# Patient Record
Sex: MALE | Race: WHITE | NOT HISPANIC OR LATINO | Employment: FULL TIME | URBAN - NONMETROPOLITAN AREA
[De-identification: names, ages, dates, MRNs, and addresses within clinical notes are randomized per-mention and may not be internally consistent; named-entity substitution may affect disease eponyms.]

---

## 2018-11-27 ENCOUNTER — TREATMENT (OUTPATIENT)
Dept: PHYSICAL THERAPY | Facility: CLINIC | Age: 53
End: 2018-11-27

## 2018-11-27 ENCOUNTER — TRANSCRIBE ORDERS (OUTPATIENT)
Dept: PHYSICAL THERAPY | Facility: CLINIC | Age: 53
End: 2018-11-27

## 2018-11-27 DIAGNOSIS — Z87.81 STATUS POST OPEN REDUCTION WITH INTERNAL FIXATION (ORIF) OF FRACTURE OF ANKLE: ICD-10-CM

## 2018-11-27 DIAGNOSIS — S92.911A CLOSED NONDISPLACED FRACTURE OF PHALANX OF TOE OF RIGHT FOOT, UNSPECIFIED TOE, INITIAL ENCOUNTER: ICD-10-CM

## 2018-11-27 DIAGNOSIS — S82.201B TYPE I OR II OPEN FRACTURE OF SHAFT OF RIGHT TIBIA, UNSPECIFIED FRACTURE MORPHOLOGY, INITIAL ENCOUNTER: Primary | ICD-10-CM

## 2018-11-27 DIAGNOSIS — Z98.890 STATUS POST OPEN REDUCTION WITH INTERNAL FIXATION (ORIF) OF FRACTURE OF ANKLE: ICD-10-CM

## 2018-11-27 DIAGNOSIS — M25.571 ACUTE RIGHT ANKLE PAIN: ICD-10-CM

## 2018-11-27 DIAGNOSIS — Z87.81 STATUS POST FRACTURE OF RIGHT TIBIA: Primary | ICD-10-CM

## 2018-11-27 DIAGNOSIS — S82.54XA CLOSED NONDISPLACED FRACTURE OF MEDIAL MALLEOLUS OF RIGHT TIBIA, INITIAL ENCOUNTER: ICD-10-CM

## 2018-11-27 PROCEDURE — 97140 MANUAL THERAPY 1/> REGIONS: CPT | Performed by: PHYSICAL THERAPIST

## 2018-11-27 PROCEDURE — 97161 PT EVAL LOW COMPLEX 20 MIN: CPT | Performed by: PHYSICAL THERAPIST

## 2018-11-27 PROCEDURE — 97110 THERAPEUTIC EXERCISES: CPT | Performed by: PHYSICAL THERAPIST
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
## 2018-11-30 ENCOUNTER — TREATMENT (OUTPATIENT)
Dept: PHYSICAL THERAPY | Facility: CLINIC | Age: 53
End: 2018-11-30

## 2018-11-30 PROCEDURE — 97140 MANUAL THERAPY 1/> REGIONS: CPT | Performed by: PHYSICAL THERAPIST

## 2018-11-30 PROCEDURE — 97110 THERAPEUTIC EXERCISES: CPT | Performed by: PHYSICAL THERAPIST

## 2018-11-30 NOTE — PROGRESS NOTES
Physical Therapy Daily Progress Note        Bar Montague reports 2/10 pain today at rest.  Pt reports pain fluctuates throughout the day and has pain up to 4/10. Pt reports ankle feels stiff but it feels good to move and is encouraged that ankle is moving so well.         Objective Pt present to PT today with no distress at rest.     Pt increased activity today and performed hip and quad exercises to help prepare for WB next visit.       See Exercise, Manual, and Modality Logs for complete treatment.     Assessment/Plan  Pt instructed to continue AROM exercises at the house. Pt to continue PT to help increase ankle ROM, mobility and function and to increase WB of R LE.       Progress per Plan of Care  Begin WB           Manual Therapy:    14     mins  51203;  Therapeutic Exercise:    29     mins  60622;     Neuromuscular Janette:        mins  04457;    Therapeutic Activity:          mins  56952;     Gait Training:        ___  mins  28249;     Ultrasound:          mins  24233;    Electrical Stimulation:         mins  96160 ( );  Dry Needling          mins self-pay    Timed Treatment:   43   mins   Total Treatment:     44   mins    Umer Kamara, PT  Physical Therapist

## 2018-12-04 ENCOUNTER — TREATMENT (OUTPATIENT)
Dept: PHYSICAL THERAPY | Facility: CLINIC | Age: 53
End: 2018-12-04

## 2018-12-04 PROCEDURE — 97140 MANUAL THERAPY 1/> REGIONS: CPT | Performed by: PHYSICAL THERAPIST

## 2018-12-04 PROCEDURE — 97110 THERAPEUTIC EXERCISES: CPT | Performed by: PHYSICAL THERAPIST

## 2018-12-04 PROCEDURE — 97116 GAIT TRAINING THERAPY: CPT | Performed by: PHYSICAL THERAPIST

## 2018-12-05 NOTE — PROGRESS NOTES
Physical Therapy Daily Progress Note        Bar Montague reports 2/10 pain today at rest.  Pt reports doing exercises at home with no problems but feels that the ankle is moving better.         Objective Pt present to PT today with no distress at rest.     Pt instructed in 3 point gait with walker with partial WB on R ankle and foot with boot on.     Pt continues to be most limited in dorsiflexion and stiff in all directions with decreased flexibility in the foot.       See Exercise, Manual, and Modality Logs for complete treatment.     Assessment/Plan  Pt continues to progress well with PT but progression is slow to avoid irritation of ankle and foot. Pt to continue PT to help increase ankle and foot mobility, strength, and function.       Progress per Plan of Care  Assess reaction to gait and WB           Manual Therapy:    17     mins  93431;  Therapeutic Exercise:    29     mins  10906;     Neuromuscular Janette:        mins  38298;    Therapeutic Activity:          mins  58776;     Gait Training:       10 ___  mins  34314;     Ultrasound:          mins  55056;    Electrical Stimulation:         mins  35779 ( );  Dry Needling          mins self-pay    Timed Treatment:   56   mins   Total Treatment:     58   mins    Umer Kamara, PT  Physical Therapist

## 2018-12-06 ENCOUNTER — TREATMENT (OUTPATIENT)
Dept: PHYSICAL THERAPY | Facility: CLINIC | Age: 53
End: 2018-12-06

## 2018-12-06 PROCEDURE — 97140 MANUAL THERAPY 1/> REGIONS: CPT | Performed by: PHYSICAL THERAPIST

## 2018-12-06 PROCEDURE — 97110 THERAPEUTIC EXERCISES: CPT | Performed by: PHYSICAL THERAPIST

## 2018-12-10 ENCOUNTER — TREATMENT (OUTPATIENT)
Dept: PHYSICAL THERAPY | Facility: CLINIC | Age: 53
End: 2018-12-10

## 2018-12-10 PROCEDURE — 97110 THERAPEUTIC EXERCISES: CPT | Performed by: PHYSICAL THERAPIST

## 2018-12-10 PROCEDURE — 97140 MANUAL THERAPY 1/> REGIONS: CPT | Performed by: PHYSICAL THERAPIST

## 2018-12-10 NOTE — PROGRESS NOTES
Physical Therapy Daily Progress Note        Bar Montague reports 1/10 pain today at rest.  Pt reports being a little sore but doing well with partial WB with walking. Pt states he is still encouraged with his ankle movement but is nervous he is not progressing fast enough.         Objective Pt present to PT today with no distress at rest.     Pt continues to have stiff ankle and foot due to healed fractures and surgeries.     Pt doing well with WB and weight shift training.       See Exercise, Manual, and Modality Logs for complete treatment.     Assessment/Plan  Pt progressing slowly with mobility and light strengthening. Pt to continue as tolerated with exercises to help increase function and independence. Pt may benefit from PT to help increase ankle mobility, function, and strength.       Progress per Plan of Care  Pt to progress as tolerated.            Manual Therapy:    17     mins  48242;  Therapeutic Exercise:    40     mins  75792;     Neuromuscular Janette:        mins  32182;    Therapeutic Activity:          mins  79851;     Gait Training:        ___  mins  06833;     Ultrasound:          mins  16559;    Electrical Stimulation:         mins  10305 ( );  Dry Needling          mins self-pay    Timed Treatment:   57   mins   Total Treatment:     58   mins    Umer Kamara, PT  Physical Therapist

## 2018-12-14 ENCOUNTER — TREATMENT (OUTPATIENT)
Dept: PHYSICAL THERAPY | Facility: CLINIC | Age: 53
End: 2018-12-14

## 2018-12-14 PROCEDURE — 97110 THERAPEUTIC EXERCISES: CPT | Performed by: PHYSICAL THERAPIST

## 2018-12-14 PROCEDURE — 97140 MANUAL THERAPY 1/> REGIONS: CPT | Performed by: PHYSICAL THERAPIST

## 2018-12-14 NOTE — PROGRESS NOTES
Subjective   Bar Montague reports: No pain at rest. Expresses frustration at progress.     Objective   Patient tolerated UE supported step throughs well with mild pain in LLE    See Exercise, Manual, and Modality Logs for complete treatment.       Assessment/Plan  Discussed nature and severity of his injury and what expectations may be reasonable. States his ankle felt more loose at end of therapy.  Progress per Plan of Care           Manual Therapy:    18     mins  85501;  Therapeutic Exercise:    41     mins  73506;     Neuromuscular Janette:        mins  73420;    Therapeutic Activity:          mins  52228;     Gait Training:           mins  37363;     Ultrasound:          mins  96107;   Iontophoresis          mins  42172   Electrical Stimulation:         mins  02594 (MC );  Dry Needling          mins self-pay  Fluidotherapy          mins 41547    Timed Treatment:   59   mins   Total Treatment:     59   mins    Fatoumata Verdin, PT  Physical Therapist

## 2018-12-17 ENCOUNTER — TREATMENT (OUTPATIENT)
Dept: PHYSICAL THERAPY | Facility: CLINIC | Age: 53
End: 2018-12-17

## 2018-12-17 PROCEDURE — 97140 MANUAL THERAPY 1/> REGIONS: CPT | Performed by: PHYSICAL THERAPIST

## 2018-12-17 PROCEDURE — 97110 THERAPEUTIC EXERCISES: CPT | Performed by: PHYSICAL THERAPIST

## 2018-12-17 NOTE — PROGRESS NOTES
Physical Therapy Daily Progress Note        Bar Montague reports 1/10 pain today at rest.  Pt states his ankle seems to be moving better and has been up and about more in the house. Pt states that he has begun to have some pain when ankle elevated.         Objective Pt present to PT today with no distress at rest.     Pt tolerated exercises today with slight pain in foot and ankle with PWB exercises.     Pt with improved motion in R ankle and less irritation/pain with manual stretching.       See Exercise, Manual, and Modality Logs for complete treatment.     Assessment/Plan  Pt continues to improve mobility and WB tolerance through PT. Pt instructed to continue HEP and WB as tolerated with boot. Pt to continue PT in order to increase ankle mobility, strength, and function.       Progress per Plan of Care  Measure ankle ROM           Manual Therapy:    16     mins  72006;  Therapeutic Exercise:    39     mins  17603;     Neuromuscular Janette:        mins  97013;    Therapeutic Activity:          mins  88460;     Gait Training:        ___  mins  58650;     Ultrasound:          mins  88680;    Electrical Stimulation:         mins  64788 ( );  Dry Needling          mins self-pay    Timed Treatment:   55   mins   Total Treatment:     55   mins    Umer Kamara, PT  Physical Therapist

## 2018-12-19 ENCOUNTER — TREATMENT (OUTPATIENT)
Dept: PHYSICAL THERAPY | Facility: CLINIC | Age: 53
End: 2018-12-19

## 2018-12-19 PROCEDURE — 97140 MANUAL THERAPY 1/> REGIONS: CPT | Performed by: PHYSICAL THERAPIST

## 2018-12-19 PROCEDURE — 97110 THERAPEUTIC EXERCISES: CPT | Performed by: PHYSICAL THERAPIST

## 2018-12-19 NOTE — PROGRESS NOTES
Physical Therapy Daily Progress Note        Bar Montague reports 1-2/10 pain today at rest.  Pt reports icing some at home since last visit which has helped a little. Pt reports he has accidentally put full weight on R foot a couple times over the past few days which has caused some pain and discomfort.         Objective Pt present to PT today with no distress at rest.     Plantarflexion: 56  Dorsiflexion: 0  Inversion: 15   Eversion: 12    Pt tolerated exercises well today with no increased pain.       See Exercise, Manual, and Modality Logs for complete treatment.     Assessment/Plan  Pt with improved ankle mobility and doing well with light strengthening and mobility exercises. Pt to continue working on mobility and WB exercises to increase WB tolerance.       Progress per Plan of Care  Assess WB tolerance           Manual Therapy:    17     mins  13086;  Therapeutic Exercise:    43     mins  86883;     Neuromuscular Janette:        mins  59146;    Therapeutic Activity:          mins  94003;     Gait Training:        ___  mins  77987;     Ultrasound:          mins  96308;    Electrical Stimulation:         mins  34387 ( );  Dry Needling          mins self-pay    Timed Treatment:   60   mins   Total Treatment:     60   mins    Umer Kamara, PT  Physical Therapist

## 2018-12-21 ENCOUNTER — TREATMENT (OUTPATIENT)
Dept: PHYSICAL THERAPY | Facility: CLINIC | Age: 53
End: 2018-12-21

## 2018-12-21 PROCEDURE — 97140 MANUAL THERAPY 1/> REGIONS: CPT | Performed by: PHYSICAL THERAPIST

## 2018-12-21 PROCEDURE — 97110 THERAPEUTIC EXERCISES: CPT | Performed by: PHYSICAL THERAPIST

## 2018-12-21 NOTE — PROGRESS NOTES
Physical Therapy Daily Progress Note        Bar Montague reports 0/10 pain today at rest.  Pt reports that his ankle and foot have been a little more sore but is doing well. Pt is concerned that he will not be able to return to work due to the decreased function of his ankle and foot.         Objective Pt present to PT today with no distress at rest.     Pt with more mobility in tarsal and metatarsal joints today.     Pt tolerated exercises well today with but had some irritation with calf stretching in the anterior syndesmosis of ankle.     See Exercise, Manual, and Modality Logs for complete treatment.     Assessment/Plan  Pt progressing slowly with mobility but improving with ROM in ankle. Pt instructed to continue advance WB tolerance when walking while at home. Pt to continue PT to help increase ankle and foot mobility and strength.       Progress per Plan of Care  Progress WB as tolerated.            Manual Therapy:    16     mins  20471;  Therapeutic Exercise:    39     mins  31233;     Neuromuscular Janette:        mins  01509;    Therapeutic Activity:          mins  10974;     Gait Training:        ___  mins  10002;     Ultrasound:          mins  04407;    Electrical Stimulation:         mins  42228 ( );  Dry Needling          mins self-pay    Timed Treatment:   55   mins   Total Treatment:     65   mins    Umer Kamara, PT  Physical Therapist

## 2018-12-26 ENCOUNTER — TREATMENT (OUTPATIENT)
Dept: PHYSICAL THERAPY | Facility: CLINIC | Age: 53
End: 2018-12-26

## 2018-12-26 PROCEDURE — 97140 MANUAL THERAPY 1/> REGIONS: CPT | Performed by: PHYSICAL THERAPIST

## 2018-12-26 PROCEDURE — 97110 THERAPEUTIC EXERCISES: CPT | Performed by: PHYSICAL THERAPIST

## 2018-12-26 NOTE — PROGRESS NOTES
Physical Therapy Daily Progress Note        Bar Montague reports 0/10 pain today at rest.  Pt reports trying to put more weight through R LE when walking and even tried to use cane. Pt continues to express worry about his job status and whether he will be able to return to former position at his job.         Objective Pt present to PT today with no distress at rest.     Pt tolerated exercises well today with no increase in pain.     Pt foot with several cavitations today with manual work on foot and toes. Pt encouraged to concentrate on his rehab and not worry about his job at this point.       See Exercise, Manual, and Modality Logs for complete treatment.     Assessment/Plan  Pt continues to improve slowly with strengthening of ankle and WB. Pt ROM still limited due to being in boot and scar tissue. Pt to continue PT to help increase ankle and foot mobility and function to improve WB tolerance and activity tolerance.       Progress per Plan of Care  Progress WB as tolerated            Manual Therapy:    17     mins  50817;  Therapeutic Exercise:    39     mins  34522;     Neuromuscular Janette:        mins  93406;    Therapeutic Activity:          mins  62178;     Gait Training:        ___  mins  75929;     Ultrasound:          mins  71619;    Electrical Stimulation:         mins  95632 ( );  Dry Needling          mins self-pay    Timed Treatment:   56   mins   Total Treatment:     56   mins    Umer Kamara, PT  Physical Therapist

## 2018-12-28 ENCOUNTER — TREATMENT (OUTPATIENT)
Dept: PHYSICAL THERAPY | Facility: CLINIC | Age: 53
End: 2018-12-28

## 2018-12-28 PROCEDURE — 97110 THERAPEUTIC EXERCISES: CPT | Performed by: PHYSICAL THERAPIST

## 2018-12-28 PROCEDURE — 97140 MANUAL THERAPY 1/> REGIONS: CPT | Performed by: PHYSICAL THERAPIST

## 2018-12-31 ENCOUNTER — TREATMENT (OUTPATIENT)
Dept: PHYSICAL THERAPY | Facility: CLINIC | Age: 53
End: 2018-12-31

## 2018-12-31 PROCEDURE — 97140 MANUAL THERAPY 1/> REGIONS: CPT | Performed by: PHYSICAL THERAPIST

## 2018-12-31 PROCEDURE — 97110 THERAPEUTIC EXERCISES: CPT | Performed by: PHYSICAL THERAPIST

## 2018-12-31 NOTE — PROGRESS NOTES
Physical Therapy Daily Progress Note        Bar Montague reports 2-3/10 pain today at rest.  Pt states that he is sore a little today due to being up and about a lot on his feet yesterday. Pt reports that he feels that the spot on his anterior shin continues to swell when WB.         Objective Pt present to PT today with no distress at rest.     Pt with some relief after PT today and said that it felt better when WB when he left.       See Exercise, Manual, and Modality Logs for complete treatment.     Assessment/Plan  Pt continues to improve slowly with motion and strength. Pt instructed to continue HEP and progress WB. Pt to continue PT to help increase function and activity tolerance in R ankle and foot.       Progress per Plan of Care  Progress as tolerated           Manual Therapy:    18     mins  59650;  Therapeutic Exercise:    39     mins  82415;     Neuromuscular Janette:        mins  44370;    Therapeutic Activity:          mins  74339;     Gait Training:        ___  mins  63270;     Ultrasound:          mins  77243;    Electrical Stimulation:         mins  94917 ( );  Dry Needling          mins self-pay    Timed Treatment:   57   mins   Total Treatment:     57   mins    Umer Kamara, PT  Physical Therapist

## 2019-01-02 ENCOUNTER — TREATMENT (OUTPATIENT)
Dept: PHYSICAL THERAPY | Facility: CLINIC | Age: 54
End: 2019-01-02

## 2019-01-02 PROCEDURE — 97110 THERAPEUTIC EXERCISES: CPT | Performed by: PHYSICAL THERAPIST

## 2019-01-02 PROCEDURE — 97140 MANUAL THERAPY 1/> REGIONS: CPT | Performed by: PHYSICAL THERAPIST

## 2019-01-02 NOTE — PROGRESS NOTES
Physical Therapy Daily Progress Note        Bar Montague reports 1/10 pain today at rest.  Pt reports that the ankle and foot have been feeling pretty good. Pt states that he believes he is improving.         Objective Pt present to PT today with no distress at rest.     Pt with noted improved motion with ABCs and exercises today.     Pt tolerated exercises well today with no increased pain in ankle and foot with activities today.       See Exercise, Manual, and Modality Logs for complete treatment.     Assessment/Plan  Pt slowly improving motion and strength in R ankle. Pt instructed to continue HEP and ankle mobility and strengthening exercises. Pt may benefit from PT to help continue to increase strength and activity tolerance in ankle and foot.       Progress per Plan of Care  Progress as tolerated.           Manual Therapy:    12     mins  79216;  Therapeutic Exercise:    50     mins  46922;     Neuromuscular Janette:        mins  96921;    Therapeutic Activity:          mins  73187;     Gait Training:        ___  mins  32469;     Ultrasound:          mins  09420;    Electrical Stimulation:         mins  24712 ( );  Dry Needling          mins self-pay    Timed Treatment:   62   mins   Total Treatment:     62   mins    Umer Kamara, PT  Physical Therapist

## 2019-01-04 ENCOUNTER — TREATMENT (OUTPATIENT)
Dept: PHYSICAL THERAPY | Facility: CLINIC | Age: 54
End: 2019-01-04

## 2019-01-04 PROCEDURE — 97140 MANUAL THERAPY 1/> REGIONS: CPT | Performed by: PHYSICAL THERAPIST

## 2019-01-04 PROCEDURE — 97110 THERAPEUTIC EXERCISES: CPT | Performed by: PHYSICAL THERAPIST

## 2019-01-04 NOTE — PROGRESS NOTES
Physical Therapy Daily Progress Note        Bar Montague reports 0/10 pain today at rest.  Pt reports trying to take steps without brace and instructed by PT to continue using brace and advance WB with less support in the boot. Pt states he is really worried about is job status but thinks he is advancing well with PT.         Objective Pt present to PT today with no distress at rest.     Pt continues to do better with weight shift activities bearing more weight through R ankle.     Pt tolerated exercises well with no increased pain in ankle.       See Exercise, Manual, and Modality Logs for complete treatment.     Assessment/Plan  Pt to continue mobility and strengthening to help increase WB tolerance and function in R ankle and foot. Pt would benefit from continued PT to help increase function in order to return to work.       Progress per Plan of Care  Assess WB tolerance and measure ROM           Manual Therapy:    14     mins  63682;  Therapeutic Exercise:    47     mins  90842;     Neuromuscular Janette:        mins  77128;    Therapeutic Activity:          mins  45823;     Gait Training:        ___  mins  22721;     Ultrasound:          mins  09895;    Electrical Stimulation:         mins  33926 ( );  Dry Needling          mins self-pay    Timed Treatment:   61   mins   Total Treatment:     62   mins    Umer Kamara, PT  Physical Therapist

## 2019-01-07 ENCOUNTER — TREATMENT (OUTPATIENT)
Dept: PHYSICAL THERAPY | Facility: CLINIC | Age: 54
End: 2019-01-07

## 2019-01-07 PROCEDURE — 97110 THERAPEUTIC EXERCISES: CPT | Performed by: PHYSICAL THERAPIST

## 2019-01-07 PROCEDURE — 97140 MANUAL THERAPY 1/> REGIONS: CPT | Performed by: PHYSICAL THERAPIST

## 2019-01-07 NOTE — PROGRESS NOTES
Physical Therapy Daily Progress Note        Bar Montague reports 1/10 pain today at rest.  Pt reports that the ankle and foot are doing much better today. Pt states he took the lining out of his boot and rearranged the brace which helped greatly allowing him more support. Pt reports his foot has been a little more swollen due to being up on it more this weekend.         Objective Pt present to PT today with no distress a rest.     R Ankle AROM  PF: 34  DF: 6  Inversion: 21  Eversion: 10    Pt tolerated exercises well today with no increased pain in foot or ankle.       See Exercise, Manual, and Modality Logs for complete treatment.     Assessment/Plan  Pt continues to improve WB tolerance with brace on and improving ankle strength and motion. Pt with decreased ankle ROM and stability due to prolonged immobilization and surgery. Pt may benefit from PT to help continue to improve ROM, strength, and WB tolerance to improve function of ankle and foot in order to return to work and daily activities.       Progress per Plan of Care  Wait for MD orders           Manual Therapy:    11     mins  44440;  Therapeutic Exercise:    40     mins  31748;     Neuromuscular Janette:        mins  63860;    Therapeutic Activity:          mins  77793;     Gait Training:        ___  mins  67423;     Ultrasound:          mins  63369;    Electrical Stimulation:         mins  33470 ( );  Dry Needling          mins self-pay    Timed Treatment:   51   mins   Total Treatment:     63   mins    Umer Kamara, PT  Physical Therapist

## 2019-01-11 ENCOUNTER — TRANSCRIBE ORDERS (OUTPATIENT)
Dept: PHYSICAL THERAPY | Facility: CLINIC | Age: 54
End: 2019-01-11

## 2019-01-11 ENCOUNTER — TREATMENT (OUTPATIENT)
Dept: PHYSICAL THERAPY | Facility: CLINIC | Age: 54
End: 2019-01-11

## 2019-01-11 DIAGNOSIS — IMO0001: ICD-10-CM

## 2019-01-11 DIAGNOSIS — S82.209E: Primary | ICD-10-CM

## 2019-01-11 DIAGNOSIS — S82.56XD: ICD-10-CM

## 2019-01-11 PROCEDURE — 97110 THERAPEUTIC EXERCISES: CPT | Performed by: PHYSICAL THERAPIST

## 2019-01-11 PROCEDURE — 97140 MANUAL THERAPY 1/> REGIONS: CPT | Performed by: PHYSICAL THERAPIST

## 2019-01-11 NOTE — PROGRESS NOTES
Physical Therapy Daily Progress Note        Bar Montague reports 0/10 pain today at rest.  Pt reports going to the doctor who was pleased with progress. Pt states that he is to wean off walker and boot. Pt states that he is excited about the progress he has seen in the last 1-2 weeks and feeling better mentally about things.         Objective Pt present to PT today with no distress at rest.     Pt educated on use of cane and able to walk well with no pain with cane in clinic.     Pt able to bear weight through foot and ankle with weight shift exercise without boot.       See Exercise, Manual, and Modality Logs for complete treatment.     Assessment/Plan  Pt has improved noticeably over the last several visits and is in much better spirits about his injury. Pt to begin WB without boot and using cane to ambulate while in boot. Pt may benefit from continued PT to help increase activity tolerance, strength, and stability in R foot and ankle.       Progress per Plan of Care  Progress WB and strengthening           Manual Therapy:    10     mins  33581;  Therapeutic Exercise:    52     mins  15180;     Neuromuscular Janette:        mins  24719;    Therapeutic Activity:          mins  87294;     Gait Training:        ___  mins  43199;     Ultrasound:          mins  85264;    Electrical Stimulation:         mins  20399 ( );  Dry Needling          mins self-pay    Timed Treatment:   62   mins   Total Treatment:     62   mins    Umer Kamara, PT  Physical Therapist

## 2019-01-15 ENCOUNTER — TREATMENT (OUTPATIENT)
Dept: PHYSICAL THERAPY | Facility: CLINIC | Age: 54
End: 2019-01-15

## 2019-01-15 PROCEDURE — 97140 MANUAL THERAPY 1/> REGIONS: CPT | Performed by: PHYSICAL THERAPIST

## 2019-01-15 PROCEDURE — 97110 THERAPEUTIC EXERCISES: CPT | Performed by: PHYSICAL THERAPIST

## 2019-01-15 NOTE — PROGRESS NOTES
Physical Therapy Daily Progress Note        Bar Montague reports <1/10 pain today at rest.  Pt states that he has not used his walker and has been using a cane since last visit. Pt states he has taken steps to bathroom and back without boot on. Pt states that he has not had much pain but swelling has increased slightly.         Objective Pt present to PT today with no distress at rest today.     Pt tolerated increased WB exercises today with only slight discomfort in foot and ankle which did not linger after exercise.       See Exercise, Manual, and Modality Logs for complete treatment.     Assessment/Plan  Pt continues to improve greatly with function over the last couple of weeks. Pt is in much better spirits about his injury and work situation. Pt to continue PT to help increase ankle and foot ROM, mobility, strength, and function to help pt return to work.       Progress per Plan of Care  Progress WB as tolerated           Manual Therapy:    13     mins  11471;  Therapeutic Exercise:    43     mins  88470;     Neuromuscular Janette:        mins  15478;    Therapeutic Activity:          mins  74668;     Gait Training:        ___  mins  57033;     Ultrasound:          mins  92240;    Electrical Stimulation:         mins  37193 ( );  Dry Needling          mins self-pay    Timed Treatment:   56   mins   Total Treatment:     58   mins    Umer Kamara, PT  Physical Therapist

## 2019-01-17 ENCOUNTER — TREATMENT (OUTPATIENT)
Dept: PHYSICAL THERAPY | Facility: CLINIC | Age: 54
End: 2019-01-17

## 2019-01-17 PROCEDURE — 97110 THERAPEUTIC EXERCISES: CPT | Performed by: PHYSICAL THERAPIST

## 2019-01-17 PROCEDURE — 97140 MANUAL THERAPY 1/> REGIONS: CPT | Performed by: PHYSICAL THERAPIST

## 2019-01-17 NOTE — PROGRESS NOTES
Physical Therapy Daily Progress Note        Bar Montague reports 0/10 pain today at rest.  Pt states that he continues to have swelling in the ankle but only a little soreness. Pt states that the ankle does not hurt and has been trying to walk with and without boot on around the house. Pt reports that he feels really good about his progress.         Objective Pt present to PT today with no distress at rest.     Pt tolerated exercises well today with no increased pain.       See Exercise, Manual, and Modality Logs for complete treatment.     Assessment/Plan  Pt continues to improve ankle and foot WB tolerance and strength. Pt progressing well with WB activities and will continue advancing as tolerated. Pt may benefit from PT to help increase ankle and foot mobility, function, and activity tolerance to help increase pain free function with ambulation and daily tasks.       Progress per Plan of Care  Progress as tolerated           Manual Therapy:    14     mins  86330;  Therapeutic Exercise:    42     mins  52617;     Neuromuscular Janette:        mins  10517;    Therapeutic Activity:          mins  15459;     Gait Training:        ___  mins  78773;     Ultrasound:          mins  93333;    Electrical Stimulation:         mins  47317 ( );  Dry Needling          mins self-pay    Timed Treatment:   56   mins   Total Treatment:     57   mins    Umer Kamara, PT  Physical Therapist

## 2019-01-22 ENCOUNTER — TREATMENT (OUTPATIENT)
Dept: PHYSICAL THERAPY | Facility: CLINIC | Age: 54
End: 2019-01-22

## 2019-01-22 PROCEDURE — 97110 THERAPEUTIC EXERCISES: CPT | Performed by: PHYSICAL THERAPIST

## 2019-01-22 PROCEDURE — 97140 MANUAL THERAPY 1/> REGIONS: CPT | Performed by: PHYSICAL THERAPIST

## 2019-01-22 NOTE — PROGRESS NOTES
Physical Therapy Daily Progress Note        Bar Montague reports <1/10 pain today at rest.  Pt states he has had some pain in the R knee beneath patella and lateral ankle. Pt reports ankle still doing much better but sometimes overdoes things at home.         Objective Pt present to PT today with no distress at rest.     Pt tolerated exercises well today with no increased pain in R ankle.       See Exercise, Manual, and Modality Logs for complete treatment.     Assessment/Plan  Pt continues to increase mobility and function of R foot and ankle. Pt to continue working on strengthening and WB tolerance. Pt may benefit from PT to help increase mobility and function of ankle to help return to normal ambulation and work duties.       Progress per Plan of Care  Progress as tolerated           Manual Therapy:    12     mins  47550;  Therapeutic Exercise:    47     mins  78441;     Neuromuscular Janette:        mins  34768;    Therapeutic Activity:          mins  38812;     Gait Training:        ___  mins  57954;     Ultrasound:          mins  09339;    Electrical Stimulation:         mins  21291 ( );  Dry Needling          mins self-pay    Timed Treatment:   59   mins   Total Treatment:     60   mins    Umer Kamara, PT  Physical Therapist

## 2019-01-24 ENCOUNTER — TREATMENT (OUTPATIENT)
Dept: PHYSICAL THERAPY | Facility: CLINIC | Age: 54
End: 2019-01-24

## 2019-01-24 PROCEDURE — 97110 THERAPEUTIC EXERCISES: CPT | Performed by: PHYSICAL THERAPIST

## 2019-01-24 PROCEDURE — 97140 MANUAL THERAPY 1/> REGIONS: CPT | Performed by: PHYSICAL THERAPIST

## 2019-01-25 NOTE — PROGRESS NOTES
Physical Therapy Daily Progress Note        Bar Montague reports 0/10 pain today at rest.  Pt states that ankle is achy and swells periodically during the day at the house. Pt states that his knee has bothered him some due to uneven walking with boot. Pt reports he is still pleased with progression with PT.         Objective Pt present to PT today with no distress at rest.     Pt tolerated exercises well today with PT session today with no increased pain in ankle.       See Exercise, Manual, and Modality Logs for complete treatment.     Assessment/Plan  Pt continues to advance WB and strengthening exercises. Pt ankle is still stiff and limited in ROM in all directions although not painful. Pt would benefit from continued PT to help increase ankle mobility, strength, and function to help increase WB activities and activity tolerance.       Progress per Plan of Care  Progress as tolerated           Manual Therapy:    14     mins  91016;  Therapeutic Exercise:    50     mins  19406;     Neuromuscular Janette:        mins  82565;    Therapeutic Activity:          mins  62785;     Gait Training:        ___  mins  64792;     Ultrasound:          mins  81957;    Electrical Stimulation:         mins  05825 ( );  Dry Needling          mins self-pay    Timed Treatment:   64   mins   Total Treatment:     64   mins    Umer Kamara, PT  Physical Therapist

## 2019-01-29 ENCOUNTER — TREATMENT (OUTPATIENT)
Dept: PHYSICAL THERAPY | Facility: CLINIC | Age: 54
End: 2019-01-29

## 2019-01-29 PROCEDURE — 97112 NEUROMUSCULAR REEDUCATION: CPT | Performed by: PHYSICAL THERAPIST

## 2019-01-29 PROCEDURE — 97140 MANUAL THERAPY 1/> REGIONS: CPT | Performed by: PHYSICAL THERAPIST

## 2019-01-29 PROCEDURE — 97110 THERAPEUTIC EXERCISES: CPT | Performed by: PHYSICAL THERAPIST

## 2019-01-29 NOTE — PROGRESS NOTES
Physical Therapy Daily Progress Note        Bar Montague reports 2/10 pain today at rest.  Pt reports that he continues to do well with WB and moving at house. Pt states he has been walking some inside the house without the boot to help with WB. Pt reports that he does get fluctuating pain depending on activity and fatigues quickly after shopping at several stores.         Objective Pt present to PT today with no distress at rest.     Pt tolerated exercises well today with no increased pain in ankle.     Pt performed trial of PWB walking on treadmill to help with gait pattern. Pt walked well but still had decreased WB time on R LE.     Pt began working on intrinsics of foot more today.       See Exercise, Manual, and Modality Logs for complete treatment.     Assessment/Plan  Pt progressing slowly but continues to do well with WB and functional exercises.  Pt with improved gait cycle with decreased WB on treadmill. Pt to begin focusing more on intrinsic strength and toe mobility to help improve stability, balance, and function of foot and ankle.       Progress per Plan of Care  Progress as tolerated           Manual Therapy:    12     mins  53009;  Therapeutic Exercise:    30     mins  81282;     Neuromuscular Janette:    16    mins  62286;    Therapeutic Activity:          mins  21491;     Gait Training:        ___  mins  31281;     Ultrasound:          mins  87847;    Electrical Stimulation:         mins  60854 ( );  Dry Needling          mins self-pay    Timed Treatment:   58   mins   Total Treatment:     58   mins    Umer Kamara, PT  Physical Therapist

## 2019-02-01 ENCOUNTER — TREATMENT (OUTPATIENT)
Dept: PHYSICAL THERAPY | Facility: CLINIC | Age: 54
End: 2019-02-01

## 2019-02-01 PROCEDURE — 97110 THERAPEUTIC EXERCISES: CPT | Performed by: PHYSICAL THERAPIST

## 2019-02-01 PROCEDURE — 97140 MANUAL THERAPY 1/> REGIONS: CPT | Performed by: PHYSICAL THERAPIST

## 2019-02-01 NOTE — PROGRESS NOTES
Physical Therapy Daily Progress Note        Bar Montague reports 0/10 pain today at rest.  Pt reports having fluctuating pain from day to day in foot and lateral ankle. Pt reports that he has been doing stairs at home with and without boot. Pt states that he is feeling a little more discouraged about progress again and feels he has hit a  [place] again.          Objective Pt present to PT today with no distress at rest.     Pt with decreased hip extension and toe off with R LE. Pt tolerated exercises well today with no increased pain in foot or ankle.       See Exercise, Manual, and Modality Logs for complete treatment.     Assessment/Plan  Pt continues to present with abnormal gait but is improving with strength and function of foot and ankle. Pt to continue PT to help increase function, mobility, and activity tolerance of R ankle and foot to help restore normal gait and function.       Progress per Plan of Care  Gait training           Manual Therapy:    11     mins  48294;  Therapeutic Exercise:    47     mins  49233;     Neuromuscular Janette:        mins  62129;    Therapeutic Activity:          mins  41321;     Gait Training:        ___  mins  02694;     Ultrasound:          mins  04134;    Electrical Stimulation:         mins  19596 ( );  Dry Needling          mins self-pay    Timed Treatment:   58   mins   Total Treatment:     58   mins    Umer Kamara, PT  Physical Therapist

## 2019-02-05 ENCOUNTER — TREATMENT (OUTPATIENT)
Dept: PHYSICAL THERAPY | Facility: CLINIC | Age: 54
End: 2019-02-05

## 2019-02-05 PROCEDURE — 97140 MANUAL THERAPY 1/> REGIONS: CPT | Performed by: PHYSICAL THERAPIST

## 2019-02-05 PROCEDURE — 97110 THERAPEUTIC EXERCISES: CPT | Performed by: PHYSICAL THERAPIST

## 2019-02-07 ENCOUNTER — TREATMENT (OUTPATIENT)
Dept: PHYSICAL THERAPY | Facility: CLINIC | Age: 54
End: 2019-02-07

## 2019-02-07 PROCEDURE — 97530 THERAPEUTIC ACTIVITIES: CPT | Performed by: PHYSICAL THERAPIST

## 2019-02-07 PROCEDURE — 97140 MANUAL THERAPY 1/> REGIONS: CPT | Performed by: PHYSICAL THERAPIST

## 2019-02-07 PROCEDURE — 97110 THERAPEUTIC EXERCISES: CPT | Performed by: PHYSICAL THERAPIST

## 2019-02-07 NOTE — PROGRESS NOTES
Physical Therapy Daily Progress Note        Bar Montague reports 0/10 pain today at rest.  Pt reports that his ankle has been doing well and has been walking a lot in the boot without the cane and some without the boot or cane. Pt reports he has been much more active around the house. Pt states he plans to get an active ankle brace this weekend.         Objective Pt present to PT today with no distress at rest.     Pt tolerated exercises well today with slight discomfort in ankle but no pain lingering after session.     Pt able to lift 27 lbs box off floor to above head. Pt able to perform full squat without pain although used L LE more than R.        See Exercise, Manual, and Modality Logs for complete treatment.     Assessment/Plan  Pt slowly gaining increased strength and function in R ankle and foot. Pt to increase activity at the house to improve squatting and WB tolerance and function. Pt would benefit from PT to help increase function, strength, and mobility of R ankle and foot to enable him to return to work.       Progress per Plan of Care  Progress as tolerated           Manual Therapy:    13     mins  35495;  Therapeutic Exercise:    31     mins  19151;     Neuromuscular Janette:        mins  45465;    Therapeutic Activity:     9     mins  93249;     Gait Training:        ___  mins  06782;     Ultrasound:          mins  47686;    Electrical Stimulation:         mins  69931 ( );  Dry Needling          mins self-pay    Timed Treatment:   53   mins   Total Treatment:     55   mins    Umer Kamara, PT  Physical Therapist

## 2019-02-13 ENCOUNTER — TREATMENT (OUTPATIENT)
Dept: PHYSICAL THERAPY | Facility: CLINIC | Age: 54
End: 2019-02-13

## 2019-02-13 PROCEDURE — 97110 THERAPEUTIC EXERCISES: CPT | Performed by: PHYSICAL THERAPIST

## 2019-02-13 NOTE — PROGRESS NOTES
Physical Therapy Daily Progress Note        Bar Montague reports 0/10 pain today at rest.  Pt reports he bought an ankle brace this weekend with laces and straps for stability. Pt states he has been wearing it exclusively and not the boot and has not been using his cane. Pt reports that he has had some soreness because he has been doing a lot on his feet, but pt says he is feeling good and is excited.         Objective Pt present to PT today with no distress at rest.     Pt able to squat and perform 52.5 lbs box lift from the floor to waist and then above head.     Pt able to perform all exercises today without increased pain in R ankle or foot.       See Exercise, Manual, and Modality Logs for complete treatment.     Assessment/Plan  Pt continues to improve and is seeing doctor on the 20th for follow up. Pt does not bear weight passively when just standing up and tends to avoid putting weight through leg. When concentrating, pt does well with WB on R LE and foot. Pt would benefit from continued PT to help increase WB on R LE and strength in R LE to help return to work.       Progress per Plan of Care  Progress functional strengthening           Manual Therapy:    5     mins  94922;  Therapeutic Exercise:     54    mins  53561;     Neuromuscular Janette:        mins  22158;    Therapeutic Activity:          mins  06868;     Gait Training:        ___  mins  79105;     Ultrasound:          mins  24783;    Electrical Stimulation:         mins  98382 ( );  Dry Needling          mins self-pay    Timed Treatment:   59   mins   Total Treatment:     59   mins    Umer Kamara, PT  Physical Therapist

## 2019-02-15 ENCOUNTER — TREATMENT (OUTPATIENT)
Dept: PHYSICAL THERAPY | Facility: CLINIC | Age: 54
End: 2019-02-15

## 2019-02-15 PROCEDURE — 97110 THERAPEUTIC EXERCISES: CPT | Performed by: PHYSICAL THERAPIST

## 2019-02-15 PROCEDURE — 97530 THERAPEUTIC ACTIVITIES: CPT | Performed by: PHYSICAL THERAPIST

## 2019-02-15 NOTE — PROGRESS NOTES
Physical Therapy Daily Progress Note        Bar Montague reports 0/10 pain today at rest.  Pt reports that he has been doing a lot and probably overdid activity yesterday being very sore after running some errands. Pt reports all activities we performed during last session felt okay the next day. Pt reports that he is seeing doctor on the 20th and is hoping to be released to work.         Objective Pt present to PT today with no distress at rest.     Pt tolerated increased WB and functional activities well today with no increased pain in ankle and foot.     Pt with some notable increase in limp when carrying 40 lbs box.       See Exercise, Manual, and Modality Logs for complete treatment.     Assessment/Plan  Pt continues to make improvements and function, strength, and activity tolerance of R ankle and foot. However, pt still has some movement and strength deficits that would benefit from being addressed prior to returning to work. Pt has said he is prepared to go back to work if necessary to keep his job, but PT believes that he would greatly benefit from 2-3 more weeks of PT to help increase strength and function and reduce chance of injury at work.       Progress per Plan of Care  Progress work simulations           Manual Therapy:         mins  55693;  Therapeutic Exercise:    31     mins  01015;     Neuromuscular Janette:        mins  79649;    Therapeutic Activity:     27     mins  58437;     Gait Training:        ___  mins  94551;     Ultrasound:          mins  75135;    Electrical Stimulation:         mins  08934 ( );  Dry Needling          mins self-pay    Timed Treatment:   58   mins   Total Treatment:     58   mins    Umer Kamara, PT  Physical Therapist

## 2019-02-19 ENCOUNTER — TREATMENT (OUTPATIENT)
Dept: PHYSICAL THERAPY | Facility: CLINIC | Age: 54
End: 2019-02-19

## 2019-02-19 PROCEDURE — 97110 THERAPEUTIC EXERCISES: CPT | Performed by: PHYSICAL THERAPIST

## 2019-02-19 PROCEDURE — 97530 THERAPEUTIC ACTIVITIES: CPT | Performed by: PHYSICAL THERAPIST

## 2019-02-19 NOTE — PROGRESS NOTES
Physical Therapy Daily Progress Note        Bar Montague reports <1/10 pain today at rest.  Pt reports that he was sore after last visit with the increased intensity but had no ankle or foot pain. Pt reports that he feels good about the ankle and foot but is most concerned about his endurance going back to work. Pt states he sees the doctor tomorrow and is hoping to be cleared to return to work. Pt states that he feels pressure to get back to his job.         Objective Pt present to PT today with no distress at rest.     R Ankle AROM:  Plantarflexion: 40  Dorsiflexion: 7  Inversion: 24  Eversion: 16    Isometric MMT:  Plantarflexion: 5/5  Dorsiflexion: 5/5  Inversion: 4+/5  Eversion: 4+/5     Pt required breaks which limited most of his activities today. Pt able to perform at a higher intensity today but did fatigue and get winded.       See Exercise, Manual, and Modality Logs for complete treatment.     Assessment/Plan  Pt ankle and foot have improved greatly with PT. Pt doing much better with functional and WB exercises but has difficulty with endurance. Pt would benefit from 2-3 more weeks of PT to work on endurance and work hardening but may have to return to work in order to keep job according pt. PT to await word from pt following doctor visit.       Progress per Plan of Care  Await doctor orders.            Manual Therapy:         mins  80403;  Therapeutic Exercise:    29     mins  47955;     Neuromuscular Janette:        mins  34561;    Therapeutic Activity:     26     mins  41213;     Gait Training:        ___  mins  65351;     Ultrasound:          mins  39324;    Electrical Stimulation:         mins  51466 ( );  Dry Needling          mins self-pay    Timed Treatment:   55   mins   Total Treatment:     57   mins    Umer Kamara, PT  Physical Therapist

## 2019-06-28 RX ORDER — LISINOPRIL 20 MG/1
20 TABLET ORAL DAILY
COMMUNITY

## 2019-07-01 ENCOUNTER — OFFICE VISIT (OUTPATIENT)
Dept: GASTROENTEROLOGY | Facility: CLINIC | Age: 54
End: 2019-07-01

## 2019-07-01 VITALS
DIASTOLIC BLOOD PRESSURE: 73 MMHG | RESPIRATION RATE: 15 BRPM | BODY MASS INDEX: 24.38 KG/M2 | HEIGHT: 72 IN | WEIGHT: 180 LBS | SYSTOLIC BLOOD PRESSURE: 127 MMHG | HEART RATE: 58 BPM | TEMPERATURE: 98.2 F

## 2019-07-01 DIAGNOSIS — Z12.11 COLON CANCER SCREENING: ICD-10-CM

## 2019-07-01 DIAGNOSIS — R74.8 ELEVATED ALKALINE PHOSPHATASE LEVEL: Primary | Chronic | ICD-10-CM

## 2019-07-01 PROCEDURE — 99203 OFFICE O/P NEW LOW 30 MIN: CPT | Performed by: NURSE PRACTITIONER

## 2019-07-01 RX ORDER — CETIRIZINE HYDROCHLORIDE 10 MG/1
10 TABLET ORAL DAILY
COMMUNITY

## 2019-07-01 RX ORDER — LORATADINE 10 MG/1
10 CAPSULE, LIQUID FILLED ORAL DAILY
COMMUNITY

## 2019-07-01 RX ORDER — SODIUM CHLORIDE 9 MG/ML
70 INJECTION, SOLUTION INTRAVENOUS CONTINUOUS PRN
Status: CANCELLED | OUTPATIENT
Start: 2019-07-01

## 2019-07-01 NOTE — PROGRESS NOTES
Chief Complaint   Patient presents with   • Colon Cancer Screening   • Elevated Hepatic Enzymes     HPI     The patient has a history of elevated alkaline phosphatase per labs in April 2019 and follow up labs in May 2019.  There is no history of liver disease in the past. The patient has a history of alcoholism but he stopped drinking alcohol 20-30 years ago. No IVDA. The patient has tattoos. No history of blood transfusions. Of interest, the patient has a history of broken bones with pins in his right leg in August 2018.    The patient denies recent change in bowel habits. There is no diarrhea or constipation. There is no history of abdominal pain. There is no history of overt GI bleed (hematemesis melena or hematochezia). The patient denies nausea or vomiting. There is no history of reflux. The patient denies dysphagia or odynophagia. There is no history of recent significant weight loss. There is no family history of colon cancer. The patient has not had a colonoscopy in the past.    Review of Systems   Constitutional: Negative for appetite change, chills, fatigue, fever and unexpected weight change.   HENT: Negative for mouth sores, nosebleeds and trouble swallowing.    Eyes: Negative for discharge and redness.   Respiratory: Negative for apnea, cough and shortness of breath.    Cardiovascular: Negative for chest pain, palpitations and leg swelling.   Gastrointestinal: Negative for abdominal distention, abdominal pain, anal bleeding, blood in stool, constipation, diarrhea, nausea and vomiting.   Endocrine: Negative for cold intolerance, heat intolerance and polydipsia.   Genitourinary: Negative for dysuria, hematuria and urgency.   Musculoskeletal: Negative for arthralgias, joint swelling and myalgias.   Skin: Negative for rash.   Allergic/Immunologic: Negative for food allergies and immunocompromised state.   Neurological: Negative for dizziness, seizures, syncope and headaches.   Hematological: Negative for  "adenopathy. Does not bruise/bleed easily.   Psychiatric/Behavioral: Negative for dysphoric mood. The patient is not nervous/anxious and is not hyperactive.      Patient Active Problem List   Diagnosis   • Elevated alkaline phosphatase level     Past Medical History:   Diagnosis Date   • HTN (hypertension)    • Tattoo      Past Surgical History:   Procedure Laterality Date   • CARPAL TUNNEL RELEASE     • TRIGGER FINGER RELEASE       Family History   Problem Relation Age of Onset   • Colon cancer Neg Hx      Social History     Tobacco Use   • Smoking status: Current Every Day Smoker     Packs/day: 2.00   • Smokeless tobacco: Former User     Quit date: 1998   Substance Use Topics   • Alcohol use: No       Current Outpatient Medications:   •  cetirizine (zyrTEC) 10 MG tablet, Take 10 mg by mouth Daily., Disp: , Rfl:   •  ibuprofen (ADVIL,MOTRIN) 600 MG tablet, Take 800 mg by mouth As Needed for Mild Pain ., Disp: , Rfl:   •  lisinopril (PRINIVIL,ZESTRIL) 20 MG tablet, Take 20 mg by mouth Daily., Disp: , Rfl:   •  Loratadine 10 MG capsule, Take 10 mg by mouth Daily., Disp: , Rfl:     No Known Allergies    /73   Pulse 58   Temp 98.2 °F (36.8 °C)   Resp 15   Ht 182.9 cm (72\")   Wt 81.6 kg (180 lb)   BMI 24.41 kg/m²     Physical Exam   Constitutional: He is oriented to person, place, and time. He appears well-developed and well-nourished. No distress.   HENT:   Head: Normocephalic and atraumatic.   Right Ear: Hearing and external ear normal.   Left Ear: Hearing and external ear normal.   Nose: Nose normal.   Mouth/Throat: Oropharynx is clear and moist and mucous membranes are normal. Mucous membranes are not pale, not dry and not cyanotic. No oral lesions. No oropharyngeal exudate.   Eyes: Conjunctivae and EOM are normal. Right eye exhibits no discharge. Left eye exhibits no discharge.   Neck: Trachea normal. Neck supple. No JVD present. No edema present. No thyroid mass and no thyromegaly present. "   Cardiovascular: Normal rate, regular rhythm, S2 normal and normal heart sounds. Exam reveals no gallop, no S3 and no friction rub.   No murmur heard.  Pulmonary/Chest: Effort normal and breath sounds normal. No respiratory distress. He exhibits no tenderness.   Abdominal: Normal appearance and bowel sounds are normal. He exhibits no distension, no ascites and no mass. There is no splenomegaly or hepatomegaly. There is no tenderness. There is no rigidity, no rebound and no guarding. No hernia.     Vascular Status -  His right foot exhibits no edema. His left foot exhibits no edema.  Lymphadenopathy:     He has no cervical adenopathy.        Left: No supraclavicular adenopathy present.   Neurological: He is alert and oriented to person, place, and time. He has normal strength. No cranial nerve deficit or sensory deficit.   Skin: No rash noted. He is not diaphoretic. No cyanosis. No pallor. Nails show no clubbing.   Psychiatric: He has a normal mood and affect.   Nursing note and vitals reviewed.  Stigmata of chronic liver disease:  None.  Asterixis:  None.    Laboratory Tests:   Upon review of records:     Dated 4/4/2019 glucose 94 BUN 16 creatinine 0.65 sodium 142 potassium 4.1 chloride 107 CO2 21 calcium 9.1 albumin 4.4 total bilirubin <0.2 alkaline phosphatase 141 ALT 13 AST 22 WBC 4.2 hematocrit 39.1 hemoglobin 13.0 platelet count 254 MCV 98 PSA 1.2 TSH 0.855    Dated 5/3/2019 albumin 4.4 total bilirubin 0.2 direct bilirubin 0.09 alkaline phosphatase 158 AST 21 ALT 9    Assessment:      ICD-10-CM ICD-9-CM   1. Elevated alkaline phosphatase level R74.8 790.5   2. Colon cancer screening Z12.11 V76.51     Plan/  Patient Instructions   1. CMP, GGT, hepatitis panel.   2. The patient may call for results.   3. Colonoscopy: Description of the procedure, risks, benefits, alternatives and options, including nonoperative options, were discussed with the patient in detail. The patient understands and wishes to proceed.      Abdoul Cardoso, APRN

## 2019-07-01 NOTE — PATIENT INSTRUCTIONS
1. CMP, GGT, hepatitis panel.   2. The patient may call for results.   3. Colonoscopy: Description of the procedure, risks, benefits, alternatives and options, including nonoperative options, were discussed with the patient in detail. The patient understands and wishes to proceed.

## 2019-07-08 ENCOUNTER — TELEPHONE (OUTPATIENT)
Dept: GASTROENTEROLOGY | Facility: CLINIC | Age: 54
End: 2019-07-08

## 2019-07-08 NOTE — TELEPHONE ENCOUNTER
Called patient about ordered labs per PETE Wylie.  Patient states he will be getting these done this week.

## 2019-07-11 ENCOUNTER — LAB (OUTPATIENT)
Dept: LAB | Facility: HOSPITAL | Age: 54
End: 2019-07-11

## 2019-07-11 DIAGNOSIS — R74.8 ELEVATED ALKALINE PHOSPHATASE LEVEL: Chronic | ICD-10-CM

## 2019-07-11 LAB
ALBUMIN SERPL-MCNC: 4.3 G/DL (ref 3.5–5)
ALBUMIN/GLOB SERPL: 1.4 G/DL (ref 1–2)
ALP SERPL-CCNC: 98 U/L (ref 38–126)
ALT SERPL W P-5'-P-CCNC: 35 U/L (ref 13–69)
ANION GAP SERPL CALCULATED.3IONS-SCNC: 12.3 MMOL/L (ref 10–20)
AST SERPL-CCNC: 30 U/L (ref 15–46)
BILIRUB SERPL-MCNC: 0.4 MG/DL (ref 0.2–1.3)
BUN BLD-MCNC: 13 MG/DL (ref 7–20)
BUN/CREAT SERPL: 21.7 (ref 6.3–21.9)
CALCIUM SPEC-SCNC: 9.1 MG/DL (ref 8.4–10.2)
CHLORIDE SERPL-SCNC: 104 MMOL/L (ref 98–107)
CO2 SERPL-SCNC: 28 MMOL/L (ref 26–30)
CREAT BLD-MCNC: 0.6 MG/DL (ref 0.6–1.3)
GFR SERPL CREATININE-BSD FRML MDRD: 141 ML/MIN/1.73
GLOBULIN UR ELPH-MCNC: 3 GM/DL
GLUCOSE BLD-MCNC: 89 MG/DL (ref 74–98)
POTASSIUM BLD-SCNC: 4.3 MMOL/L (ref 3.5–5.1)
PROT SERPL-MCNC: 7.3 G/DL (ref 6.3–8.2)
SODIUM BLD-SCNC: 140 MMOL/L (ref 137–145)

## 2019-07-11 PROCEDURE — 36415 COLL VENOUS BLD VENIPUNCTURE: CPT

## 2019-07-11 PROCEDURE — 80053 COMPREHEN METABOLIC PANEL: CPT

## 2019-07-17 ENCOUNTER — TELEPHONE (OUTPATIENT)
Dept: GASTROENTEROLOGY | Facility: CLINIC | Age: 54
End: 2019-07-17

## 2019-07-17 NOTE — TELEPHONE ENCOUNTER
----- Message from PETE Salinas sent at 7/17/2019  9:08 AM EDT -----  Let him know his liver enzymes were normal.

## 2019-07-18 ENCOUNTER — TELEPHONE (OUTPATIENT)
Dept: GASTROENTEROLOGY | Facility: CLINIC | Age: 54
End: 2019-07-18

## 2019-07-18 NOTE — TELEPHONE ENCOUNTER
Secend Attempt: Called patient regarding future labs that have not all been completed. I've also, sent letter for remaining overdue labs.

## 2019-07-24 ENCOUNTER — LAB (OUTPATIENT)
Dept: LAB | Facility: HOSPITAL | Age: 54
End: 2019-07-24

## 2019-07-24 DIAGNOSIS — R74.8 ELEVATED ALKALINE PHOSPHATASE LEVEL: Chronic | ICD-10-CM

## 2019-07-24 LAB — GGT SERPL-CCNC: 19 U/L (ref 12–58)

## 2019-07-24 PROCEDURE — 82977 ASSAY OF GGT: CPT

## 2019-07-24 PROCEDURE — 36415 COLL VENOUS BLD VENIPUNCTURE: CPT

## 2019-07-24 PROCEDURE — 86708 HEPATITIS A ANTIBODY: CPT

## 2019-07-24 PROCEDURE — 86706 HEP B SURFACE ANTIBODY: CPT

## 2019-07-24 PROCEDURE — 86704 HEP B CORE ANTIBODY TOTAL: CPT

## 2019-07-24 PROCEDURE — 80074 ACUTE HEPATITIS PANEL: CPT

## 2019-07-25 LAB
HAV AB SER QL IA: NEGATIVE
HAV IGM SERPL QL IA: NEGATIVE
HBV CORE AB SER DONR QL IA: NEGATIVE
HBV CORE IGM SERPL QL IA: NEGATIVE
HBV SURFACE AB SER QL: NON REACTIVE
HBV SURFACE AG SERPL QL IA: NEGATIVE
HCV AB S/CO SERPL IA: <0.1 S/CO RATIO (ref 0–0.9)

## 2023-06-09 NOTE — PROGRESS NOTES
Physical Therapy Daily Progress Note        Bar Montague reports 0/10 pain today at rest.  Pt reports that he has been walking around the house without boot on R foot but uses cane. Pt reports that with the boot he feels that he really does not need the cane. Pt reports that he is still concerned about his status prior to returning to work around the 25th of this month.         Objective Pt present to PT today with no distress at rest.     Pt tolerated exercises well today and R ankle and foot loosened up during the session today, feeling better when leaving today.       See Exercise, Manual, and Modality Logs for complete treatment.     Assessment/Plan  Pt continues to make progress a little at a time. Pt has increased WB tolerance and gait has improved in boot without the cane. PT suggested that pt get a smaller ankle brace to help when out of boot. Pt would benefit from PT to help continue to increase function, activity tolerance, and strength in R ankle and foot in order to return to work.       Progress per Plan of Care  Assess gait without cane.            Manual Therapy:    10     mins  07659;  Therapeutic Exercise:    49     mins  20608;     Neuromuscular Janette:        mins  08360;    Therapeutic Activity:          mins  75702;     Gait Training:        ___  mins  03427;     Ultrasound:          mins  60107;    Electrical Stimulation:         mins  54464 ( );  Dry Needling          mins self-pay    Timed Treatment:   59   mins   Total Treatment:     59   mins    Umer Kamara, PT  Physical Therapist         Cell Phone/PDA (specify)/Clothing